# Patient Record
Sex: FEMALE | Race: WHITE
[De-identification: names, ages, dates, MRNs, and addresses within clinical notes are randomized per-mention and may not be internally consistent; named-entity substitution may affect disease eponyms.]

---

## 2018-01-10 ENCOUNTER — HOSPITAL ENCOUNTER (OUTPATIENT)
Dept: HOSPITAL 62 - OD | Age: 28
End: 2018-01-10
Attending: ADVANCED PRACTICE MIDWIFE
Payer: COMMERCIAL

## 2018-01-10 DIAGNOSIS — Z11.4: ICD-10-CM

## 2018-01-10 DIAGNOSIS — Z34.83: Primary | ICD-10-CM

## 2018-01-10 DIAGNOSIS — Z11.3: ICD-10-CM

## 2018-01-10 DIAGNOSIS — Z11.59: ICD-10-CM

## 2018-01-10 PROCEDURE — 36415 COLL VENOUS BLD VENIPUNCTURE: CPT

## 2018-01-10 PROCEDURE — 86592 SYPHILIS TEST NON-TREP QUAL: CPT

## 2018-01-10 PROCEDURE — 86701 HIV-1ANTIBODY: CPT

## 2018-03-21 LAB
ADD MANUAL DIFF: NO
APPEARANCE UR: CLEAR
APTT PPP: (no result) S
BARBITURATES UR QL SCN: NEGATIVE
BASOPHILS # BLD AUTO: 0 10^3/UL (ref 0–0.2)
BASOPHILS NFR BLD AUTO: 0.2 % (ref 0–2)
BILIRUB UR QL STRIP: NEGATIVE
EOSINOPHIL # BLD AUTO: 0 10^3/UL (ref 0–0.6)
EOSINOPHIL NFR BLD AUTO: 0.2 % (ref 0–6)
ERYTHROCYTE [DISTWIDTH] IN BLOOD BY AUTOMATED COUNT: 13.1 % (ref 11.5–14)
GLUCOSE UR STRIP-MCNC: NEGATIVE MG/DL
HCT VFR BLD CALC: 30.6 % (ref 36–47)
HGB BLD-MCNC: 10.4 G/DL (ref 12–15.5)
KETONES UR STRIP-MCNC: NEGATIVE MG/DL
LYMPHOCYTES # BLD AUTO: 2.1 10^3/UL (ref 0.5–4.7)
LYMPHOCYTES NFR BLD AUTO: 23.5 % (ref 13–45)
MCH RBC QN AUTO: 29.7 PG (ref 27–33.4)
MCHC RBC AUTO-ENTMCNC: 34.1 G/DL (ref 32–36)
MCV RBC AUTO: 87 FL (ref 80–97)
METHADONE UR QL SCN: NEGATIVE
MONOCYTES # BLD AUTO: 0.7 10^3/UL (ref 0.1–1.4)
MONOCYTES NFR BLD AUTO: 7.6 % (ref 3–13)
NEUTROPHILS # BLD AUTO: 6 10^3/UL (ref 1.7–8.2)
NEUTS SEG NFR BLD AUTO: 68.5 % (ref 42–78)
NITRITE UR QL STRIP: NEGATIVE
PCP UR QL SCN: NEGATIVE
PH UR STRIP: 7 [PH] (ref 5–9)
PLATELET # BLD: 222 10^3/UL (ref 150–450)
PROT UR STRIP-MCNC: NEGATIVE MG/DL
RBC # BLD AUTO: 3.51 10^6/UL (ref 3.72–5.28)
SP GR UR STRIP: 1
TOTAL CELLS COUNTED % (AUTO): 100 %
URINE AMPHETAMINES SCREEN: NEGATIVE
URINE BENZODIAZEPINES SCREEN: NEGATIVE
URINE COCAINE SCREEN: NEGATIVE
URINE MARIJUANA (THC) SCREEN: NEGATIVE
UROBILINOGEN UR-MCNC: NEGATIVE MG/DL (ref ?–2)
WBC # BLD AUTO: 8.7 10^3/UL (ref 4–10.5)

## 2018-03-22 ENCOUNTER — HOSPITAL ENCOUNTER (INPATIENT)
Dept: HOSPITAL 62 - 2S | Age: 28
LOS: 3 days | Discharge: HOME | End: 2018-03-25
Attending: OBSTETRICS & GYNECOLOGY | Admitting: OBSTETRICS & GYNECOLOGY
Payer: COMMERCIAL

## 2018-03-22 DIAGNOSIS — Z88.3: ICD-10-CM

## 2018-03-22 DIAGNOSIS — Z3A.39: ICD-10-CM

## 2018-03-22 DIAGNOSIS — Z82.49: ICD-10-CM

## 2018-03-22 DIAGNOSIS — O34.211: Primary | ICD-10-CM

## 2018-03-22 DIAGNOSIS — D62: ICD-10-CM

## 2018-03-22 DIAGNOSIS — Z88.8: ICD-10-CM

## 2018-03-22 DIAGNOSIS — Z79.899: ICD-10-CM

## 2018-03-22 PROCEDURE — 94799 UNLISTED PULMONARY SVC/PX: CPT

## 2018-03-22 PROCEDURE — 88302 TISSUE EXAM BY PATHOLOGIST: CPT

## 2018-03-22 PROCEDURE — 59025 FETAL NON-STRESS TEST: CPT

## 2018-03-22 PROCEDURE — 36415 COLL VENOUS BLD VENIPUNCTURE: CPT

## 2018-03-22 PROCEDURE — 81001 URINALYSIS AUTO W/SCOPE: CPT

## 2018-03-22 PROCEDURE — 86900 BLOOD TYPING SEROLOGIC ABO: CPT

## 2018-03-22 PROCEDURE — 80307 DRUG TEST PRSMV CHEM ANLYZR: CPT

## 2018-03-22 PROCEDURE — 85025 COMPLETE CBC W/AUTO DIFF WBC: CPT

## 2018-03-22 PROCEDURE — 85027 COMPLETE CBC AUTOMATED: CPT

## 2018-03-22 PROCEDURE — 86850 RBC ANTIBODY SCREEN: CPT

## 2018-03-22 PROCEDURE — 86901 BLOOD TYPING SEROLOGIC RH(D): CPT

## 2018-03-23 PROCEDURE — 0UB70ZZ EXCISION OF BILATERAL FALLOPIAN TUBES, OPEN APPROACH: ICD-10-PCS | Performed by: OBSTETRICS & GYNECOLOGY

## 2018-03-23 PROCEDURE — 4A1HXCZ MONITORING OF PRODUCTS OF CONCEPTION, CARDIAC RATE, EXTERNAL APPROACH: ICD-10-PCS | Performed by: OBSTETRICS & GYNECOLOGY

## 2018-03-23 RX ADMIN — FENTANYL CITRATE ONE MCG: 50 INJECTION INTRAMUSCULAR; INTRAVENOUS at 10:19

## 2018-03-23 RX ADMIN — Medication SCH ML: at 22:14

## 2018-03-23 RX ADMIN — DOCUSATE SODIUM SCH MG: 100 CAPSULE, LIQUID FILLED ORAL at 17:26

## 2018-03-23 RX ADMIN — FENTANYL CITRATE ONE MCG: 50 INJECTION INTRAMUSCULAR; INTRAVENOUS at 09:57

## 2018-03-23 RX ADMIN — OXYCODONE AND ACETAMINOPHEN PRN TAB: 5; 325 TABLET ORAL at 18:01

## 2018-03-23 RX ADMIN — Medication SCH ML: at 13:52

## 2018-03-23 RX ADMIN — FENTANYL CITRATE ONE MCG: 50 INJECTION INTRAMUSCULAR; INTRAVENOUS at 10:34

## 2018-03-23 RX ADMIN — KETOROLAC TROMETHAMINE SCH MG: 30 INJECTION, SOLUTION INTRAMUSCULAR at 22:15

## 2018-03-23 RX ADMIN — HYDROMORPHONE HYDROCHLORIDE ONE MG: 2 INJECTION INTRAMUSCULAR; INTRAVENOUS; SUBCUTANEOUS at 10:37

## 2018-03-23 RX ADMIN — KETOROLAC TROMETHAMINE SCH MG: 30 INJECTION, SOLUTION INTRAMUSCULAR at 13:53

## 2018-03-23 RX ADMIN — HYDROMORPHONE HYDROCHLORIDE ONE MG: 2 INJECTION INTRAMUSCULAR; INTRAVENOUS; SUBCUTANEOUS at 10:45

## 2018-03-24 LAB
ERYTHROCYTE [DISTWIDTH] IN BLOOD BY AUTOMATED COUNT: 13.1 % (ref 11.5–14)
HCT VFR BLD CALC: 26.7 % (ref 36–47)
HGB BLD-MCNC: 9 G/DL (ref 12–15.5)
MCH RBC QN AUTO: 29.1 PG (ref 27–33.4)
MCHC RBC AUTO-ENTMCNC: 33.7 G/DL (ref 32–36)
MCV RBC AUTO: 86 FL (ref 80–97)
PLATELET # BLD: 184 10^3/UL (ref 150–450)
RBC # BLD AUTO: 3.09 10^6/UL (ref 3.72–5.28)
WBC # BLD AUTO: 8.9 10^3/UL (ref 4–10.5)

## 2018-03-24 RX ADMIN — SIMETHICONE PRN MG: 80 TABLET, CHEWABLE ORAL at 22:38

## 2018-03-24 RX ADMIN — SIMETHICONE PRN MG: 80 TABLET, CHEWABLE ORAL at 10:09

## 2018-03-24 RX ADMIN — DOCUSATE SODIUM SCH MG: 100 CAPSULE, LIQUID FILLED ORAL at 09:01

## 2018-03-24 RX ADMIN — IBUPROFEN SCH MG: 800 TABLET, FILM COATED ORAL at 13:31

## 2018-03-24 RX ADMIN — OXYCODONE AND ACETAMINOPHEN PRN TAB: 5; 325 TABLET ORAL at 13:33

## 2018-03-24 RX ADMIN — SIMETHICONE PRN MG: 80 TABLET, CHEWABLE ORAL at 14:36

## 2018-03-24 RX ADMIN — SIMETHICONE PRN MG: 80 TABLET, CHEWABLE ORAL at 07:20

## 2018-03-24 RX ADMIN — KETOROLAC TROMETHAMINE SCH MG: 30 INJECTION, SOLUTION INTRAMUSCULAR at 05:19

## 2018-03-24 RX ADMIN — DOCUSATE SODIUM SCH MG: 100 CAPSULE, LIQUID FILLED ORAL at 18:11

## 2018-03-24 RX ADMIN — OXYCODONE AND ACETAMINOPHEN PRN TAB: 5; 325 TABLET ORAL at 22:32

## 2018-03-24 RX ADMIN — Medication SCH CAP: at 09:01

## 2018-03-24 RX ADMIN — IBUPROFEN SCH MG: 800 TABLET, FILM COATED ORAL at 18:11

## 2018-03-24 RX ADMIN — OXYCODONE AND ACETAMINOPHEN PRN TAB: 5; 325 TABLET ORAL at 01:36

## 2018-03-24 NOTE — PDOC PROGRESS REPORT
Subjective-OB


Progress Note for:: 18


Subjective: 





day X1 s/p r c/s





Doing well, passing gas, ambulating, tolerating diet, lochia is stable, voiding 

without difficulty. Bonding with baby well. 





Physical Exam (OB)


Vital Signs: 


 











Temp Pulse Resp BP Pulse Ox


 


 98.4 F   90   18   129/72 H  100 


 


 18 08:08  18 08:08  18 08:08  18 08:08  18 08:08








 Intake & Output











 18





 06:59 06:59 06:59


 


Intake Total  4410 


 


Output Total  3900 


 


Balance  510 


 


Weight 67.132 kg  














- PIH/Pre-Eclampsia


Clonus: Negative


Headache: Absent


Epigastric Pain: No


Visual Changes: No





- 


Dressing Removed: No


Incision: Dressing, Well Approximated





- Lochia


Lochia Amount: Small 10-25 ml


Lochia Color: Rubra/Red





- Abdomen


Description: Soft, Round


Hernia Present: No


Fundal Description: Firm


Fundal Height: u/u - u/2





Objective-Diagnostic


Laboratory: 


 





 18 06:37 





 











  18





  06:37


 


WBC  8.9


 


RBC  3.09 L


 


Hgb  9.0 L


 


Hct  26.7 L


 


MCV  86


 


MCH  29.1


 


MCHC  33.7


 


RDW  13.1


 


Plt Count  184














Assessment and Plan(PN)





- Assessment and Plan


(1) S/P repeat low transverse 


Is this a current diagnosis for this admission?: Yes   


Plan: 


routine postop care








- Time Spent with Patient


Time with patient: Less than 15 minutes


Critical Time spent with patient: Less than 15 minutes


Medications reviewed and adjusted accordingly: Yes





- Disposition


Anticipated Discharge: Home


Within: within 24 hours

## 2018-03-25 VITALS — DIASTOLIC BLOOD PRESSURE: 60 MMHG | SYSTOLIC BLOOD PRESSURE: 120 MMHG

## 2018-03-25 RX ADMIN — IBUPROFEN SCH MG: 800 TABLET, FILM COATED ORAL at 05:17

## 2018-03-25 RX ADMIN — Medication SCH CAP: at 09:55

## 2018-03-25 RX ADMIN — SIMETHICONE PRN MG: 80 TABLET, CHEWABLE ORAL at 08:24

## 2018-03-25 RX ADMIN — OXYCODONE AND ACETAMINOPHEN PRN TAB: 5; 325 TABLET ORAL at 09:55

## 2018-03-25 RX ADMIN — DOCUSATE SODIUM SCH MG: 100 CAPSULE, LIQUID FILLED ORAL at 09:55

## 2018-03-25 RX ADMIN — IBUPROFEN SCH MG: 800 TABLET, FILM COATED ORAL at 01:44

## 2018-03-25 NOTE — PDOC DISCHARGE SUMMARY
Final Diagnosis


Discharge Date: 18





- Final Diagnosis


(1) S/P repeat low transverse 


Is this a current diagnosis for this admission?: Yes   





(2) Acute blood loss anemia


Is this a current diagnosis for this admission?: Yes   





Discharge Data





- Discharge Medication


Prescriptions: 


Oxycodone HCl/Acetaminophen [Percocet 5-325 mg Tablet] 2 tab PO Q4HP PRN #30 

tablet


 PRN Reason: 


Docusate Sodium [Colace 100 mg Capsule] 100 mg PO BID #60 capsule


Ibuprofen [Motrin 800 mg Tablet] 800 mg PO Q6 #60 tablet


Home Medications: 








L.acidoph,Paracasei, B.lactis [Probiotic] 1 each PO DAILY 18 


Omeprazole Magnesium [Prilosec Otc] 20 mg PO DAILY 18 


Prenat 115/Iron Fum/Folic/Dss [Prenatal 19 Tablet] 1 each PO DAILY 18 


Ranitidine HCl [Zantac 75 mg Tablet] 75 mg PO BID 18 


Docusate Sodium [Colace 100 mg Capsule] 100 mg PO BID #60 capsule 18 


Ibuprofen [Motrin 800 mg Tablet] 800 mg PO Q6 #60 tablet 18 


Oxycodone HCl/Acetaminophen [Percocet 5-325 mg Tablet] 2 tab PO Q4HP PRN #30 

tablet 18 








Gestational Age: 39


Reason(s) for Admission: Ceasarean Section-Repeat


Prenatal Procedures: NST


Intrapartum Procedure(s): : Low Cervical, Transverse, Tubal Ligation





-  Data


  ** Baby 1 Female


Apgar at 1 minute: 9


Apgar at 5 minutes: 9


Weight: 3.203 kg


Home with Mother: Yes


Complications: No





- Diagnosis Test


Laboratory: 


 











Temp Pulse Resp BP Pulse Ox


 


 98.6 F   75   16   124/80   99 


 


 18 07:42  18 07:42  18 07:42  18 07:42  18 07:42








 











  18





  09:30 12:05 06:37


 


RBC   3.51 L  3.09 L


 


Hgb   10.4 L  9.0 L


 


Hct   30.6 L  26.7 L


 


Urine Opiates Screen  NEGATIVE  














- Discharge information/Instructions


Discharge Activity: Activity As Tolerated, No Driving, No Lifting Over 10 Pounds

, Pelvic Rest, No tub bath


Discharge Diet: Regular


Disposition: HOME, SELF-CARE


Follow up with: [facility]


in: 1, Weeks - on floridix for iron